# Patient Record
Sex: FEMALE | URBAN - METROPOLITAN AREA
[De-identification: names, ages, dates, MRNs, and addresses within clinical notes are randomized per-mention and may not be internally consistent; named-entity substitution may affect disease eponyms.]

---

## 2019-11-10 ENCOUNTER — NURSE TRIAGE (OUTPATIENT)
Dept: NURSING | Facility: CLINIC | Age: 29
End: 2019-11-10

## 2019-11-11 NOTE — TELEPHONE ENCOUNTER
Caller states she is having sharp pain in her lower left pelvic area. Caller states the pain started about a week ago with increase in discharge that is yellowish/white in color. Caller states tonight, the pain is sharper after intercourse, and rates pain 8/10. Triage guidelines recommend to go to ED. Caller verbalized and understands directives.    Reason for Disposition    [1] SEVERE pelvic pain AND [2] present > 1 hour    Additional Information    Negative: Shock suspected (e.g., cold/pale/clammy skin, too weak to stand, low BP, rapid pulse)    Negative: Passed out (i.e., lost consciousness, collapsed and was not responding)    Negative: Sounds like a life-threatening emergency to the triager    Negative: Menstrual cramps is main concern    Negative: Abdominal (stomach) pain is main concern    Negative: Vulvar (external genital area) pain or symptoms (e.g., dryness, sores, redness, blisters, bumps) is main concern    Negative: Rectal pain is main concern    Negative: Hip pain is main concern    Negative: Urination pain is main concern (pain with passing urine)    Negative: [1] Pelvic pain AND [2] pregnant < 20 weeks    Negative: [1] Pelvic pain AND [2] pregnant > 20 weeks    Negative: [1] Pelvic pain AND [2] postpartum < 1 month since delivery    Negative: Pain associated with known hernia or new-onset hernia suspected (reducible bulge in groin/abdomen)    Negative: Followed an abdomen (stomach) injury    Negative: Followed a genital area injury    Negative: [1] SEVERE pelvic pain (e.g., excruciating) AND [2] vomiting    Protocols used: PELVIC PAIN - FEMALE-A-